# Patient Record
Sex: MALE | Race: WHITE | NOT HISPANIC OR LATINO
[De-identification: names, ages, dates, MRNs, and addresses within clinical notes are randomized per-mention and may not be internally consistent; named-entity substitution may affect disease eponyms.]

---

## 2017-06-23 ENCOUNTER — CHARTING TRANS (OUTPATIENT)
Dept: OTHER | Age: 37
End: 2017-06-23

## 2018-11-03 VITALS
BODY MASS INDEX: 35.42 KG/M2 | WEIGHT: 300 LBS | HEIGHT: 77 IN | TEMPERATURE: 98 F | SYSTOLIC BLOOD PRESSURE: 128 MMHG | OXYGEN SATURATION: 99 % | RESPIRATION RATE: 18 BRPM | HEART RATE: 76 BPM | DIASTOLIC BLOOD PRESSURE: 74 MMHG

## 2022-04-18 ENCOUNTER — WALK IN (OUTPATIENT)
Dept: URGENT CARE | Age: 42
End: 2022-04-18

## 2022-04-18 VITALS
HEIGHT: 77 IN | HEART RATE: 92 BPM | BODY MASS INDEX: 35.97 KG/M2 | WEIGHT: 304.68 LBS | SYSTOLIC BLOOD PRESSURE: 130 MMHG | DIASTOLIC BLOOD PRESSURE: 70 MMHG | OXYGEN SATURATION: 98 % | TEMPERATURE: 97.8 F | RESPIRATION RATE: 12 BRPM

## 2022-04-18 DIAGNOSIS — H00.021 HORDEOLUM INTERNUM OF RIGHT UPPER EYELID: Primary | ICD-10-CM

## 2022-04-18 PROCEDURE — 99203 OFFICE O/P NEW LOW 30 MIN: CPT | Performed by: NURSE PRACTITIONER

## 2022-04-18 RX ORDER — ERYTHROMYCIN 5 MG/G
OINTMENT OPHTHALMIC
Qty: 3.5 G | Refills: 0 | Status: SHIPPED | OUTPATIENT
Start: 2022-04-18

## 2022-04-18 ASSESSMENT — ENCOUNTER SYMPTOMS
FEVER: 0
NAUSEA: 0
FOREIGN BODY SENSATION: 0
NEUROLOGICAL NEGATIVE: 1
DOUBLE VISION: 0
EYE ITCHING: 1
VOMITING: 0
GASTROINTESTINAL NEGATIVE: 1
EYE PAIN: 0
PSYCHIATRIC NEGATIVE: 1
EYE REDNESS: 1
BLURRED VISION: 0
RESPIRATORY NEGATIVE: 1
PHOTOPHOBIA: 0
EYE DISCHARGE: 0

## 2022-04-18 ASSESSMENT — VISUAL ACUITY: OU: 1

## 2024-01-23 ENCOUNTER — APPOINTMENT (OUTPATIENT)
Dept: URBAN - METROPOLITAN AREA CLINIC 314 | Age: 44
Setting detail: DERMATOLOGY
End: 2024-02-05

## 2024-01-23 DIAGNOSIS — L73.1 PSEUDOFOLLICULITIS BARBAE: ICD-10-CM

## 2024-01-23 PROCEDURE — OTHER MIPS QUALITY: OTHER

## 2024-01-23 PROCEDURE — OTHER COUNSELING: OTHER

## 2024-01-23 PROCEDURE — OTHER ADDITIONAL NOTES: OTHER

## 2024-01-23 PROCEDURE — 99202 OFFICE O/P NEW SF 15 MIN: CPT

## 2024-01-23 ASSESSMENT — LOCATION DETAILED DESCRIPTION DERM
LOCATION DETAILED: RIGHT CENTRAL LATERAL NECK
LOCATION DETAILED: RIGHT SUPERIOR MEDIAL BUCCAL CHEEK
LOCATION DETAILED: RIGHT CHIN
LOCATION DETAILED: PHILTRUM
LOCATION DETAILED: LEFT CENTRAL LATERAL NECK
LOCATION DETAILED: LEFT SUPERIOR LATERAL BUCCAL CHEEK

## 2024-01-23 ASSESSMENT — LOCATION ZONE DERM
LOCATION ZONE: NECK
LOCATION ZONE: FACE
LOCATION ZONE: LIP

## 2024-01-23 ASSESSMENT — LOCATION SIMPLE DESCRIPTION DERM
LOCATION SIMPLE: NECK
LOCATION SIMPLE: LEFT CHEEK
LOCATION SIMPLE: RIGHT CHEEK
LOCATION SIMPLE: UPPER LIP
LOCATION SIMPLE: CHIN

## 2024-01-23 NOTE — PROCEDURE: ADDITIONAL NOTES
Render Risk Assessment In Note?: no
Detail Level: Simple
Additional Notes: Plan: Patient was given PFB letters.\\nWell controlled, not requiring topicals, if doesn't regularly do close shaes\\nNew paperwork requires choices of \"mild/moderate PFB, CAN shave for respirator use in emergencies or when able to follow appropriate shaving protocol and treatment\" vs \"mod/severe, CANNOT shave for respirator use in emergencies or when able to follow appropriate shaving protocol and treatment\".\\nIt seems that mild/moderate option would still require regular shaving, which due to pt's mod-severe PFB should be avoided.\\nHowever, reviewed and pt verbalized agreement that if requiring gas mask for non-training purposes (for personal protection at work), would recommend close shave for optimal fit, as risks from possible chemical exposures outweigh benefit of not shaving in these instances.

## 2024-01-23 NOTE — HPI: RASH
What Type Of Note Output Would You Prefer (Optional)?: Bullet Format
How Severe Is Your Rash?: moderate
Is This A New Presentation, Or A Follow-Up?: Rash
Additional History: Patient is requesting PFB documentation for work. Patient is also mentioning that he was given a topical which didn’t work. Any treatments works better on his scalp skin, but does not work on beard skin.

## 2024-03-29 ENCOUNTER — V-VISIT (OUTPATIENT)
Dept: URGENT CARE | Age: 44
End: 2024-03-29

## 2024-03-29 VITALS
DIASTOLIC BLOOD PRESSURE: 78 MMHG | WEIGHT: 315 LBS | HEART RATE: 100 BPM | RESPIRATION RATE: 14 BRPM | BODY MASS INDEX: 37.19 KG/M2 | TEMPERATURE: 99.2 F | OXYGEN SATURATION: 97 % | HEIGHT: 77 IN | SYSTOLIC BLOOD PRESSURE: 124 MMHG

## 2024-03-29 DIAGNOSIS — H92.02 LEFT EAR PAIN: Primary | ICD-10-CM

## 2024-03-29 DIAGNOSIS — R09.81 NASAL SINUS CONGESTION: ICD-10-CM

## 2024-03-29 ASSESSMENT — ENCOUNTER SYMPTOMS
DIZZINESS: 0
SORE THROAT: 0
COUGH: 0
HEADACHES: 0
SPEECH DIFFICULTY: 0
RHINORRHEA: 0

## 2025-01-21 ENCOUNTER — APPOINTMENT (OUTPATIENT)
Dept: URBAN - METROPOLITAN AREA CLINIC 314 | Age: 45
Setting detail: DERMATOLOGY
End: 2025-01-21

## 2025-01-21 DIAGNOSIS — L73.1 PSEUDOFOLLICULITIS BARBAE: ICD-10-CM

## 2025-01-21 PROCEDURE — OTHER MIPS QUALITY: OTHER

## 2025-01-21 PROCEDURE — OTHER ADDITIONAL NOTES: OTHER

## 2025-01-21 PROCEDURE — OTHER COUNSELING: OTHER

## 2025-01-21 PROCEDURE — 99212 OFFICE O/P EST SF 10 MIN: CPT

## 2025-01-21 ASSESSMENT — LOCATION DETAILED DESCRIPTION DERM
LOCATION DETAILED: RIGHT SUPERIOR LATERAL BUCCAL CHEEK
LOCATION DETAILED: LEFT CHIN
LOCATION DETAILED: LEFT SUPERIOR LATERAL BUCCAL CHEEK

## 2025-01-21 ASSESSMENT — LOCATION SIMPLE DESCRIPTION DERM
LOCATION SIMPLE: RIGHT CHEEK
LOCATION SIMPLE: LEFT CHEEK
LOCATION SIMPLE: CHIN

## 2025-01-21 ASSESSMENT — LOCATION ZONE DERM: LOCATION ZONE: FACE

## 2025-01-21 NOTE — PROCEDURE: ADDITIONAL NOTES
Additional Notes: Plan: Patient is well controlled as long as avoids close shaves. \\nWas provided PFB paperwork.\\nNew paperwork requires choices of \"mild/moderate PFB, CAN shave for respirator use in emergencies or when able to follow appropriate shaving protocol and treatment\" vs \"mod/severe, CANNOT shave for respirator use in emergencies or when able to follow appropriate shaving protocol and treatment\".\\nDiscussed that regular shaving, even with protocol, should be avoided.\\nHowever, reviewed and pt verbalized agreement that if requiring respirator use for non-training purposes (for personal protection at work), would recommend close shave for optimal fit, or using respirator option which works with facial hair if available, as risks from possible chemical exposures outweigh benefit of not shaving in these instances.\\nDiscussed can rtc for treatment if there is a situation where it is necessary for him to shave, doesn't need additional treatment as well controlled currently.
Render Risk Assessment In Note?: no
Detail Level: Simple